# Patient Record
Sex: MALE | Race: WHITE | NOT HISPANIC OR LATINO | Employment: UNEMPLOYED
[De-identification: names, ages, dates, MRNs, and addresses within clinical notes are randomized per-mention and may not be internally consistent; named-entity substitution may affect disease eponyms.]

---
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
## 2021-01-02 DIAGNOSIS — S82.852A CLOSED TRIMALLEOLAR FRACTURE OF LEFT ANKLE, INITIAL ENCOUNTER: ICD-10-CM

## 2021-01-02 PROCEDURE — U0003 INFECTIOUS AGENT DETECTION BY NUCLEIC ACID (DNA OR RNA); SEVERE ACUTE RESPIRATORY SYNDROME CORONAVIRUS 2 (SARS-COV-2) (CORONAVIRUS DISEASE [COVID-19]), AMPLIFIED PROBE TECHNIQUE, MAKING USE OF HIGH THROUGHPUT TECHNOLOGIES AS DESCRIBED BY CMS-2020-01-R: HCPCS | Performed by: PHYSICIAN ASSISTANT

## 2021-01-02 PROCEDURE — U0005 INFEC AGEN DETEC AMPLI PROBE: HCPCS | Performed by: PHYSICIAN ASSISTANT

## 2021-01-03 LAB
LABORATORY COMMENT REPORT: NORMAL
SARS-COV-2 RNA SPEC QL NAA+PROBE: NEGATIVE
SARS-COV-2 RNA SPEC QL NAA+PROBE: NORMAL
SPECIMEN SOURCE: NORMAL
SPECIMEN SOURCE: NORMAL

## 2021-01-06 ENCOUNTER — NURSE TRIAGE (OUTPATIENT)
Dept: NURSING | Facility: CLINIC | Age: 21
End: 2021-01-06

## 2021-01-07 NOTE — TELEPHONE ENCOUNTER
Caller had ORIF of ankle fracture yesterday; Noticing dried  red blood  on lateral dressing through  ACE banadage. size of a quarter ,when he opened the CAM  boot . Didn't see it before but can't be sure   no other problems   advised to pooja perimeters of blood stain with pen  Advised to adjust  air in CAM so less pressure on  wound   advised to call back if it seems to be increasing  Or if he has any  other new or worsening symptoms   advised to call ortho clinic in morning with update   Caller understands and will comply   Nicky Smith RN  FNA      Reason for Disposition    [1] Caller has NON-URGENT question AND [2] triager unable to answer question    Additional Information    Negative: [1] Major abdominal surgical incision AND [2] wound gaping open AND [3] visible internal organs    Negative: Sounds like a life-threatening emergency to the triager    Negative: [1] Bleeding from incision AND [2] won't stop after 10 minutes of direct pressure    Negative: [1] Widespread rash AND [2] bright red, sunburn-like    Negative: Severe pain in the incision    Negative: [1] Incision gaping open AND [2] < 48 hours since wound re-opened    Negative: [1] Incision gaping open AND [2] length of opening > 2 inches (5 cm)    Negative: Patient sounds very sick or weak to the triager    Negative: Sounds like a serious complication to the triager    Negative: Fever > 100.4 F (38.0 C)    Negative: [1] Incision looks infected (spreading redness, pain) AND [2] fever > 99.5 F (37.5 C)    Negative: [1] Incision looks infected (spreading redness, pain) AND [2] large red area (> 2 in. or 5 cm)    Negative: [1] Incision looks infected (spreading redness, pain) AND [2] face wound    Negative: [1] Red streak runs from the incision AND [2] longer than 1 inch (2.5 cm)    Negative: [1] Pus or bad-smelling fluid draining from incision AND [2] no fever    Negative: [1] Post-op pain AND [2] not controlled with pain medications    Negative:  Dressing soaked with blood or body fluid (e.g., drainage)    Negative: [1] Raised bruise and [2] size > 2 inches (5 cm) and expanding    Negative: [1] Caller has URGENT question AND [2] triager unable to answer question    Negative: [1] INCREASING pain in incision AND [2] > 2 days (48 hours) since surgery    Negative: [1] Small red area or streak AND [2] no fever    Negative: [1] Clear or blood-tinged fluid draining from wound AND [2] no fever    Negative: [1] Incision gaping open AND [2] > 48 hours since wound re-opened AND [3] length of opening > 1/2 inch (12 mm)    Negative: [1] Incision on face gaping open after skin glue AND [2] > 48 hours since wound re-opened 3] length of opening > 1/4 inch (6 mm)    Negative: Suture or staple removal is overdue    Protocols used: POST-OP INCISION SYMPTOMS-A-AH

## 2021-01-20 ENCOUNTER — ANCILLARY PROCEDURE (OUTPATIENT)
Dept: GENERAL RADIOLOGY | Facility: CLINIC | Age: 21
End: 2021-01-20
Attending: ORTHOPAEDIC SURGERY
Payer: MEDICAID

## 2021-01-20 ENCOUNTER — OFFICE VISIT (OUTPATIENT)
Dept: ORTHOPEDICS | Facility: CLINIC | Age: 21
End: 2021-01-20
Payer: MEDICAID

## 2021-01-20 VITALS
DIASTOLIC BLOOD PRESSURE: 75 MMHG | SYSTOLIC BLOOD PRESSURE: 139 MMHG | HEART RATE: 86 BPM | BODY MASS INDEX: 32 KG/M2 | HEIGHT: 70 IN | OXYGEN SATURATION: 98 %

## 2021-01-20 DIAGNOSIS — S82.852A CLOSED TRIMALLEOLAR FRACTURE OF LEFT ANKLE, INITIAL ENCOUNTER: Primary | ICD-10-CM

## 2021-01-20 DIAGNOSIS — S82.852A CLOSED TRIMALLEOLAR FRACTURE OF LEFT ANKLE, INITIAL ENCOUNTER: ICD-10-CM

## 2021-01-20 PROCEDURE — 73610 X-RAY EXAM OF ANKLE: CPT | Mod: LT | Performed by: RADIOLOGY

## 2021-01-20 PROCEDURE — 99024 POSTOP FOLLOW-UP VISIT: CPT | Performed by: ORTHOPAEDIC SURGERY

## 2021-01-20 ASSESSMENT — PAIN SCALES - GENERAL: PAINLEVEL: NO PAIN (0)

## 2021-01-20 NOTE — LETTER
1/20/2021         RE: Zackery Chakraborty  [address]  [address]  [facility] [address]        Dear Colleague,    Thank you for referring your patient, Zackery Chakraborty, to the [facility]. Please see a copy of my visit note below.    CHIEF COMPLAINT:   Chief Complaint   Patient presents with     Left Ankle - Surgical Followup     Fracture - open reduction internal fixation. DOS 1/5/21, 2 wk s/p. Patient is present non weightbearing in the boot. Patient notes his ankle is doing good. He had one fall but landed on his buttocks. He has remained in boot non weightbearing.         SURGICAL PROCEDURE: ([facility])  1. Open reduction internal fixation  left ankle fracture, with fixation of the medial malleolus and fibula shaft fracture .  2. Left ankle syndesmotic fixation with tightrope device  3. Closed treatment posterior malleolus fracture.  DATE OF PROCEDURE: 1/5/2021      HISTORY OF PRESENT ILLNESS    Zackery Chakraborty is a 20 year old male seen for postoperative follow-up of a left ankle fracture open-reduction, internal fixation  that occurred 2 weeks ago. Since surgery, pain has been improving. Denies fevers, chills, night sweats. No wound problems. Compliant with weight bearing restrictions and elevation in boot. There have been no issues since surgery. Denies numbness or tingling. Took pain medications only twice since surgery and that was the first day only. Pain  0/10.    He reports a fall since surgery but doesn't think he injured the ankle, he fell back onto his buttocks.    Other PMH:  has no past medical history on file.  Patient Active Problem List   Diagnosis     Intermittent asthma     ADHD (attention deficit hyperactivity disorder)     Tourette syndrome     Seasonal allergies     Constipation     Obesity       Past surgical History:  has no past surgical history on file.    Medications:   Current Outpatient Medications:      albuterol (PROAIR HFA, PROVENTIL HFA, VENTOLIN HFA)  "108 (90 BASE) MCG/ACT inhaler, Inhale 2 puffs into the lungs every 6 hours as needed for shortness of breath / dyspnea, Disp: 1 Inhaler, Rfl: 1     fluticasone (FLONASE) 50 MCG/ACT nasal spray, Spray 2 sprays into both nostrils daily, Disp: 16 g, Rfl: 11     loratadine (CLARITIN) 10 MG tablet, Take 1 tablet (10 mg) by mouth daily, Disp: 90 tablet, Rfl: 1     olopatadine (PATANOL) 0.1 % ophthalmic solution, Place 1 drop into both eyes 2 times daily, Disp: 5 mL, Rfl: 3    Allergies:   Allergies   Allergen Reactions     Augmentin Diarrhea     Sulfa Drugs Rash       REVIEW OF SYSTEMS:  CONSTITUTIONAL:NEGATIVE for fever, chills, change in weight  INTEGUMENTARY/SKIN: NEGATIVE for worrisome rashes, moles or lesions  MUSCULOSKELETAL:See HPI above  NEURO: NEGATIVE for weakness, dizziness or paresthesias      PHYSICAL EXAM:  /75   Pulse 86   Ht 1.778 m (5' 10\")   SpO2 98%   BMI 32.00 kg/m     GENERAL APPEARANCE: healthy, alert, no distress   SKIN: no suspicious lesions or rashes  NEURO: Normal strength and tone, mentation intact and speech normal  PSYCH:  mentation appears normal and affect normal  RESPIRATORY: No increased work of breathing.          left LOWER EXTREMITY EXAM:  Gait: using crutches for support  Intact sensation deep peroneal nerve, superficial peroneal nerve, med/lat tibial nerve, sural nerve, saphenous nerve but slight decreased isolated to the big toe  Intact EHL, EDL, TA, FHL, GS, quadriceps hamstrings and hip flexors  Toes warm and well perfused, brisk capillary refill. Palpable 2+ dp pulses.  calf soft and nttp or squeeze.  Edema: none    Wound / Incision: medial and lateral based incisions healing well, sutures in place, skin edges everted.  Inspection: swelling, ecchymosis  Palpation: tender to palpation medial and lateral ankle, incisions.  Non-tender: calf.  Strength: grossly intact ankle dorsiflexion and plantarflexion.      X-RAY:  3 views left ankle from 1/20/2021 were reviewed in " clinic today. Reduction of medial malleolus with 2 screws. The fibula shaft fracture reduced with plate/screws. Posterior malleolus fracture reduced.          Impression: 20 year old male  2 weeks  postoperative open-reduction, internal fixation  left ankle fracture, doing well.    Plan:   * images reviewed  * suture removal.  * monitor big toe numbness, could be from boot wear/pressure.  Weight bearing status: non weight bearing, strict  Pain control: over the counter as needed.  Immobilization: fracture boot.  Elevation of affected extremity  Images: 3 views left ankle  Return to clinic in 4 weeks, or sooner as needed.      Emerson Duke M.D., M.S.  Dept. of Orthopaedic Surgery  [facility]      Again, thank you for allowing me to participate in the care of your patient.        Sincerely,        Emerson Duke MD

## 2021-01-20 NOTE — PROGRESS NOTES
CHIEF COMPLAINT:   Chief Complaint   Patient presents with     Left Ankle - Surgical Followup     Fracture - open reduction internal fixation. DOS 1/5/21, 2 wk s/p. Patient is present non weightbearing in the boot. Patient notes his ankle is doing good. He had one fall but landed on his buttocks. He has remained in boot non weightbearing.         SURGICAL PROCEDURE: ([facility])  1. Open reduction internal fixation  left ankle fracture, with fixation of the medial malleolus and fibula shaft fracture .  2. Left ankle syndesmotic fixation with tightrope device  3. Closed treatment posterior malleolus fracture.  DATE OF PROCEDURE: 1/5/2021      HISTORY OF PRESENT ILLNESS    Zackery Chakraborty is a 20 year old male seen for postoperative follow-up of a left ankle fracture open-reduction, internal fixation  that occurred 2 weeks ago. Since surgery, pain has been improving. Denies fevers, chills, night sweats. No wound problems. Compliant with weight bearing restrictions and elevation in boot. There have been no issues since surgery. Denies numbness or tingling. Took pain medications only twice since surgery and that was the first day only. Pain  0/10.    He reports a fall since surgery but doesn't think he injured the ankle, he fell back onto his buttocks.    Other PMH:  has no past medical history on file.  Patient Active Problem List   Diagnosis     Intermittent asthma     ADHD (attention deficit hyperactivity disorder)     Tourette syndrome     Seasonal allergies     Constipation     Obesity       Past surgical History:  has no past surgical history on file.    Medications:   Current Outpatient Medications:      albuterol (PROAIR HFA, PROVENTIL HFA, VENTOLIN HFA) 108 (90 BASE) MCG/ACT inhaler, Inhale 2 puffs into the lungs every 6 hours as needed for shortness of breath / dyspnea, Disp: 1 Inhaler, Rfl: 1     fluticasone (FLONASE) 50 MCG/ACT nasal spray, Spray 2 sprays into both nostrils daily, Disp: 16 g, Rfl:  "11     loratadine (CLARITIN) 10 MG tablet, Take 1 tablet (10 mg) by mouth daily, Disp: 90 tablet, Rfl: 1     olopatadine (PATANOL) 0.1 % ophthalmic solution, Place 1 drop into both eyes 2 times daily, Disp: 5 mL, Rfl: 3    Allergies:   Allergies   Allergen Reactions     Augmentin Diarrhea     Sulfa Drugs Rash       REVIEW OF SYSTEMS:  CONSTITUTIONAL:NEGATIVE for fever, chills, change in weight  INTEGUMENTARY/SKIN: NEGATIVE for worrisome rashes, moles or lesions  MUSCULOSKELETAL:See HPI above  NEURO: NEGATIVE for weakness, dizziness or paresthesias      PHYSICAL EXAM:  /75   Pulse 86   Ht 1.778 m (5' 10\")   SpO2 98%   BMI 32.00 kg/m     GENERAL APPEARANCE: healthy, alert, no distress   SKIN: no suspicious lesions or rashes  NEURO: Normal strength and tone, mentation intact and speech normal  PSYCH:  mentation appears normal and affect normal  RESPIRATORY: No increased work of breathing.          left LOWER EXTREMITY EXAM:  Gait: using crutches for support  Intact sensation deep peroneal nerve, superficial peroneal nerve, med/lat tibial nerve, sural nerve, saphenous nerve but slight decreased isolated to the big toe  Intact EHL, EDL, TA, FHL, GS, quadriceps hamstrings and hip flexors  Toes warm and well perfused, brisk capillary refill. Palpable 2+ dp pulses.  calf soft and nttp or squeeze.  Edema: none    Wound / Incision: medial and lateral based incisions healing well, sutures in place, skin edges everted.  Inspection: swelling, ecchymosis  Palpation: tender to palpation medial and lateral ankle, incisions.  Non-tender: calf.  Strength: grossly intact ankle dorsiflexion and plantarflexion.      X-RAY:  3 views left ankle from 1/20/2021 were reviewed in clinic today. Reduction of medial malleolus with 2 screws. The fibula shaft fracture reduced with plate/screws. Posterior malleolus fracture reduced.          Impression: 20 year old male  2 weeks  postoperative open-reduction, internal fixation  left ankle " fracture, doing well.    Plan:   * images reviewed  * suture removal.  * monitor big toe numbness, could be from boot wear/pressure.  Weight bearing status: non weight bearing, strict  Pain control: over the counter as needed.  Immobilization: fracture boot.  Elevation of affected extremity  Images: 3 views left ankle  Return to clinic in 4 weeks, or sooner as needed.      Emerson Duke M.D., M.S.  Dept. of Orthopaedic Surgery  [facility]

## 2021-02-18 ENCOUNTER — OFFICE VISIT (OUTPATIENT)
Dept: ORTHOPEDICS | Facility: CLINIC | Age: 21
End: 2021-02-18
Payer: COMMERCIAL

## 2021-02-18 ENCOUNTER — ANCILLARY PROCEDURE (OUTPATIENT)
Dept: GENERAL RADIOLOGY | Facility: CLINIC | Age: 21
End: 2021-02-18
Attending: ORTHOPAEDIC SURGERY
Payer: COMMERCIAL

## 2021-02-18 VITALS
HEART RATE: 94 BPM | BODY MASS INDEX: 32 KG/M2 | OXYGEN SATURATION: 95 % | DIASTOLIC BLOOD PRESSURE: 64 MMHG | SYSTOLIC BLOOD PRESSURE: 113 MMHG | HEIGHT: 70 IN

## 2021-02-18 DIAGNOSIS — S82.852A CLOSED TRIMALLEOLAR FRACTURE OF LEFT ANKLE, INITIAL ENCOUNTER: Primary | ICD-10-CM

## 2021-02-18 DIAGNOSIS — S82.852A CLOSED TRIMALLEOLAR FRACTURE OF LEFT ANKLE, INITIAL ENCOUNTER: ICD-10-CM

## 2021-02-18 PROCEDURE — 73610 X-RAY EXAM OF ANKLE: CPT | Mod: LT | Performed by: RADIOLOGY

## 2021-02-18 PROCEDURE — 99024 POSTOP FOLLOW-UP VISIT: CPT | Performed by: ORTHOPAEDIC SURGERY

## 2021-02-18 ASSESSMENT — PAIN SCALES - GENERAL: PAINLEVEL: NO PAIN (0)

## 2021-02-18 NOTE — LETTER
2/18/2021         RE: Zackery Chakraborty  [address]  [address]  [place] [address]        Dear Colleague,    Thank you for referring your patient, Zackery Chakraborty, to the [facility]. Please see a copy of my visit note below.    CHIEF COMPLAINT:   Chief Complaint   Patient presents with     Left Ankle - Surgical Followup     Fracture - open reduction internal fixation. DOS 1/5/21, 6 wk s/p. Patient notes his ankle is doing good. Patient is present using crutches and in boot. He denies any pain.          SURGICAL PROCEDURE: ([facility])  1. Open reduction internal fixation  left ankle fracture, with fixation of the medial malleolus and fibula shaft fracture .  2. Left ankle syndesmotic fixation with tightrope device  3. Closed treatment posterior malleolus fracture.  DATE OF PROCEDURE: 1/5/2021      HISTORY OF PRESENT ILLNESS    Zackery Chakraborty is a 20 year old male seen for postoperative follow-up of a left ankle fracture open-reduction, internal fixation  that occurred 6 weeks ago. Returns today doing well. No pain. Denies fevers, chills, night sweats. No wound problems. Compliant with weight bearing restrictions and elevation in boot. There have been no issues since surgery. Denies numbness or tingling. Took pain medications only twice since surgery and that was the first day only. Pain  0/10. Has been caught sleep walking a few times this week in his boot, no noted pain the next morning.      Other PMH:  has no past medical history on file.  Patient Active Problem List   Diagnosis     Intermittent asthma     ADHD (attention deficit hyperactivity disorder)     Tourette syndrome     Seasonal allergies     Constipation     Obesity       Past surgical History:  has no past surgical history on file.    Medications:   Current Outpatient Medications:      albuterol (PROAIR HFA, PROVENTIL HFA, VENTOLIN HFA) 108 (90 BASE) MCG/ACT inhaler, Inhale 2 puffs into the lungs every 6  "hours as needed for shortness of breath / dyspnea, Disp: 1 Inhaler, Rfl: 1     fluticasone (FLONASE) 50 MCG/ACT nasal spray, Spray 2 sprays into both nostrils daily, Disp: 16 g, Rfl: 11     loratadine (CLARITIN) 10 MG tablet, Take 1 tablet (10 mg) by mouth daily, Disp: 90 tablet, Rfl: 1     olopatadine (PATANOL) 0.1 % ophthalmic solution, Place 1 drop into both eyes 2 times daily, Disp: 5 mL, Rfl: 3    Allergies:   Allergies   Allergen Reactions     Augmentin Diarrhea     Sulfa Drugs Rash       REVIEW OF SYSTEMS:  CONSTITUTIONAL:NEGATIVE for fever, chills, change in weight  INTEGUMENTARY/SKIN: NEGATIVE for worrisome rashes, moles or lesions  MUSCULOSKELETAL:See HPI above  NEURO: NEGATIVE for weakness, dizziness or paresthesias      PHYSICAL EXAM:  /64   Pulse 94   Ht 1.778 m (5' 10\")   SpO2 95%   BMI 32.00 kg/m     GENERAL APPEARANCE: healthy, alert, no distress   SKIN: no suspicious lesions or rashes  NEURO: Normal strength and tone, mentation intact and speech normal  PSYCH:  mentation appears normal and affect normal  RESPIRATORY: No increased work of breathing.          left LOWER EXTREMITY EXAM:  Gait: using crutches for support  Intact sensation deep peroneal nerve, superficial peroneal nerve, med/lat tibial nerve, sural nerve, saphenous nerve but slight decreased isolated to the big toe  Intact EHL, EDL, TA, FHL, GS, quadriceps hamstrings and hip flexors  Toes warm and well perfused, brisk capillary refill. Palpable 2+ dp pulses.  calf soft and nttp or squeeze.  Edema: none    Wound / Incision: medial and lateral based incisions healing well  Inspection: mild swelling, no ecchymosis, no erythema  Palpation: minimal tender to palpation medial and lateral ankle, incisions.  Non-tender: calf.  Strength: grossly intact ankle dorsiflexion and plantarflexion.  Range of motion: decreased.    X-RAY:  3 views left ankle from 2/18/2021 were reviewed in clinic today. Reduction of medial malleolus with 2 " screws. The fibula shaft fracture reduced with plate/screws. Posterior malleolus fracture reduced. Fracture lucency decreased. Symmetric mortise.          Impression: 20 year old male 6 weeks  postoperative open-reduction, internal fixation  left ankle fracture, doing well.    Plan:   * images reviewed, fractures healing well.  Weight bearing status: progress weight bearing in boot, start 50% this week, 75% the following week then 100%. Must be in boot.  Pain control: over the counter as needed.  Immobilization: fracture boot. Off at rest, hygiene, home exercise program and Physical Therapy.  Elevation of affected extremity  Physical Therapy referral, aggressive range of motion.  Images: 3 views left ankle  Return to clinic in 4 weeks, or sooner as needed. Plan transition to ankle brace at that time.      Emerson Duke M.D., M.S.  Dept. of Orthopaedic Surgery  [facility]      Again, thank you for allowing me to participate in the care of your patient.        Sincerely,        Emerson Duke MD

## 2021-02-18 NOTE — PROGRESS NOTES
CHIEF COMPLAINT:   Chief Complaint   Patient presents with     Left Ankle - Surgical Followup     Fracture - open reduction internal fixation. DOS 1/5/21, 6 wk s/p. Patient notes his ankle is doing good. Patient is present using crutches and in boot. He denies any pain.          SURGICAL PROCEDURE: ([facility])  1. Open reduction internal fixation  left ankle fracture, with fixation of the medial malleolus and fibula shaft fracture .  2. Left ankle syndesmotic fixation with tightrope device  3. Closed treatment posterior malleolus fracture.  DATE OF PROCEDURE: 1/5/2021      HISTORY OF PRESENT ILLNESS    Zackery Chakraborty is a 20 year old male seen for postoperative follow-up of a left ankle fracture open-reduction, internal fixation  that occurred 6 weeks ago. Returns today doing well. No pain. Denies fevers, chills, night sweats. No wound problems. Compliant with weight bearing restrictions and elevation in boot. There have been no issues since surgery. Denies numbness or tingling. Took pain medications only twice since surgery and that was the first day only. Pain  0/10. Has been caught sleep walking a few times this week in his boot, no noted pain the next morning.      Other PMH:  has no past medical history on file.  Patient Active Problem List   Diagnosis     Intermittent asthma     ADHD (attention deficit hyperactivity disorder)     Tourette syndrome     Seasonal allergies     Constipation     Obesity       Past surgical History:  has no past surgical history on file.    Medications:   Current Outpatient Medications:      albuterol (PROAIR HFA, PROVENTIL HFA, VENTOLIN HFA) 108 (90 BASE) MCG/ACT inhaler, Inhale 2 puffs into the lungs every 6 hours as needed for shortness of breath / dyspnea, Disp: 1 Inhaler, Rfl: 1     fluticasone (FLONASE) 50 MCG/ACT nasal spray, Spray 2 sprays into both nostrils daily, Disp: 16 g, Rfl: 11     loratadine (CLARITIN) 10 MG tablet, Take 1 tablet (10 mg) by mouth daily,  "Disp: 90 tablet, Rfl: 1     olopatadine (PATANOL) 0.1 % ophthalmic solution, Place 1 drop into both eyes 2 times daily, Disp: 5 mL, Rfl: 3    Allergies:   Allergies   Allergen Reactions     Augmentin Diarrhea     Sulfa Drugs Rash       REVIEW OF SYSTEMS:  CONSTITUTIONAL:NEGATIVE for fever, chills, change in weight  INTEGUMENTARY/SKIN: NEGATIVE for worrisome rashes, moles or lesions  MUSCULOSKELETAL:See HPI above  NEURO: NEGATIVE for weakness, dizziness or paresthesias      PHYSICAL EXAM:  /64   Pulse 94   Ht 1.778 m (5' 10\")   SpO2 95%   BMI 32.00 kg/m     GENERAL APPEARANCE: healthy, alert, no distress   SKIN: no suspicious lesions or rashes  NEURO: Normal strength and tone, mentation intact and speech normal  PSYCH:  mentation appears normal and affect normal  RESPIRATORY: No increased work of breathing.          left LOWER EXTREMITY EXAM:  Gait: using crutches for support  Intact sensation deep peroneal nerve, superficial peroneal nerve, med/lat tibial nerve, sural nerve, saphenous nerve but slight decreased isolated to the big toe  Intact EHL, EDL, TA, FHL, GS, quadriceps hamstrings and hip flexors  Toes warm and well perfused, brisk capillary refill. Palpable 2+ dp pulses.  calf soft and nttp or squeeze.  Edema: none    Wound / Incision: medial and lateral based incisions healing well  Inspection: mild swelling, no ecchymosis, no erythema  Palpation: minimal tender to palpation medial and lateral ankle, incisions.  Non-tender: calf.  Strength: grossly intact ankle dorsiflexion and plantarflexion.  Range of motion: decreased.    X-RAY:  3 views left ankle from 2/18/2021 were reviewed in clinic today. Reduction of medial malleolus with 2 screws. The fibula shaft fracture reduced with plate/screws. Posterior malleolus fracture reduced. Fracture lucency decreased. Symmetric mortise.          Impression: 20 year old male 6 weeks  postoperative open-reduction, internal fixation  left ankle fracture, doing " well.    Plan:   * images reviewed, fractures healing well.  Weight bearing status: progress weight bearing in boot, start 50% this week, 75% the following week then 100%. Must be in boot.  Pain control: over the counter as needed.  Immobilization: fracture boot. Off at rest, hygiene, home exercise program and Physical Therapy.  Elevation of affected extremity  Physical Therapy referral, aggressive range of motion.  Images: 3 views left ankle  Return to clinic in 4 weeks, or sooner as needed. Plan transition to ankle brace at that time.      Emerson Duke M.D., M.S.  Dept. of Orthopaedic Surgery  [facility]

## 2021-03-01 ENCOUNTER — THERAPY VISIT (OUTPATIENT)
Dept: PHYSICAL THERAPY | Facility: CLINIC | Age: 21
End: 2021-03-01
Attending: PHYSICIAN ASSISTANT
Payer: COMMERCIAL

## 2021-03-01 DIAGNOSIS — S82.852A CLOSED TRIMALLEOLAR FRACTURE OF LEFT ANKLE, INITIAL ENCOUNTER: ICD-10-CM

## 2021-03-01 DIAGNOSIS — M25.572 LEFT ANKLE PAIN, UNSPECIFIED CHRONICITY: ICD-10-CM

## 2021-03-01 DIAGNOSIS — Z47.89 AFTERCARE FOLLOWING SURGERY OF THE MUSCULOSKELETAL SYSTEM, NEC: ICD-10-CM

## 2021-03-01 PROCEDURE — 97110 THERAPEUTIC EXERCISES: CPT | Mod: GP | Performed by: PHYSICAL THERAPIST

## 2021-03-01 PROCEDURE — 97161 PT EVAL LOW COMPLEX 20 MIN: CPT | Mod: GP | Performed by: PHYSICAL THERAPIST

## 2021-03-15 ENCOUNTER — THERAPY VISIT (OUTPATIENT)
Dept: PHYSICAL THERAPY | Facility: CLINIC | Age: 21
End: 2021-03-15
Payer: COMMERCIAL

## 2021-03-15 DIAGNOSIS — M25.572 LEFT ANKLE PAIN, UNSPECIFIED CHRONICITY: ICD-10-CM

## 2021-03-15 DIAGNOSIS — Z47.89 AFTERCARE FOLLOWING SURGERY OF THE MUSCULOSKELETAL SYSTEM, NEC: ICD-10-CM

## 2021-03-15 PROCEDURE — 97110 THERAPEUTIC EXERCISES: CPT | Mod: GP | Performed by: PHYSICAL THERAPIST

## 2021-03-25 ENCOUNTER — THERAPY VISIT (OUTPATIENT)
Dept: PHYSICAL THERAPY | Facility: CLINIC | Age: 21
End: 2021-03-25
Payer: COMMERCIAL

## 2021-03-25 ENCOUNTER — ANCILLARY PROCEDURE (OUTPATIENT)
Dept: GENERAL RADIOLOGY | Facility: CLINIC | Age: 21
End: 2021-03-25
Attending: ORTHOPAEDIC SURGERY
Payer: COMMERCIAL

## 2021-03-25 ENCOUNTER — OFFICE VISIT (OUTPATIENT)
Dept: ORTHOPEDICS | Facility: CLINIC | Age: 21
End: 2021-03-25
Payer: COMMERCIAL

## 2021-03-25 VITALS
OXYGEN SATURATION: 96 % | HEIGHT: 70 IN | HEART RATE: 93 BPM | BODY MASS INDEX: 32 KG/M2 | SYSTOLIC BLOOD PRESSURE: 123 MMHG | DIASTOLIC BLOOD PRESSURE: 66 MMHG

## 2021-03-25 DIAGNOSIS — S82.852A CLOSED TRIMALLEOLAR FRACTURE OF LEFT ANKLE, INITIAL ENCOUNTER: ICD-10-CM

## 2021-03-25 DIAGNOSIS — S82.852D CLOSED TRIMALLEOLAR FRACTURE OF LEFT ANKLE WITH ROUTINE HEALING, SUBSEQUENT ENCOUNTER: Primary | ICD-10-CM

## 2021-03-25 DIAGNOSIS — Z47.89 AFTERCARE FOLLOWING SURGERY OF THE MUSCULOSKELETAL SYSTEM, NEC: ICD-10-CM

## 2021-03-25 DIAGNOSIS — M25.572 LEFT ANKLE PAIN, UNSPECIFIED CHRONICITY: ICD-10-CM

## 2021-03-25 PROCEDURE — 97110 THERAPEUTIC EXERCISES: CPT | Mod: GP | Performed by: PHYSICAL THERAPIST

## 2021-03-25 PROCEDURE — 99024 POSTOP FOLLOW-UP VISIT: CPT | Performed by: ORTHOPAEDIC SURGERY

## 2021-03-25 PROCEDURE — 97140 MANUAL THERAPY 1/> REGIONS: CPT | Mod: GP | Performed by: PHYSICAL THERAPIST

## 2021-03-25 PROCEDURE — 73610 X-RAY EXAM OF ANKLE: CPT | Mod: LT | Performed by: RADIOLOGY

## 2021-03-25 ASSESSMENT — PAIN SCALES - GENERAL: PAINLEVEL: NO PAIN (0)

## 2021-03-25 NOTE — PROGRESS NOTES
Subjective:  HPI  Physical Exam                    Objective:  System    Physical Exam    General     ROS    Assessment/Plan:    PROGRESS  REPORT    Progress reporting period is from 3/01/21 to 3/25/21.       SUBJECTIVE  Patient reports significant improvement in his left ankle since the start of therapy.  He is currently 3 months S/P.  Motion feels a lot better.   He has been able to ambulate up to an hour in the boot without any problem.   If he tries to go longer than this, heel starts to get sore.    Current pain level is 1/10 (occasionally in heel) .     Previous pain level was  1/10  .   Changes in function:  Yes (See Goal flowsheet attached for changes in current functional level)  Adverse reaction to treatment or activity: None    OBJECTIVE  AROM L Ankle: DF 0, PF 55, INV 22, EV 10  Flexibility:  Decreased in gastroc and especially soleus  Strength L Ankle:  All motions 5/5 and painfree.  Scar mobility is good  Swelling:  None  Gait: ambulates FWB'ing in boot        ASSESSMENT/PLAN  Updated problem list and treatment plan: Diagnosis 1:  Left Ankle Pain   Pain -  self management, education and home program  Decreased ROM/flexibility - manual therapy and therapeutic exercise  Impaired gait - gait training  Decreased function - home program  STG/LTGs have been met or progress has been made towards goals:  Yes (See Goal flow sheet completed today.)  Assessment of Progress: The patient's condition is improving.  Patient is meeting short term goals and is progressing towards long term goals.  Self Management Plans:  Patient has been instructed in a home texercise program.  I have re-evaluated this patient and find that the nature, scope, duration and intensity of the therapy is appropriate for the medical condition of the patient.  Zackery continues to require the following intervention to meet STG and LTG's:  PT    Recommendations:  This patient would benefit from continued therapy to further progress with ROM,  strengthening, and proprioception once out of the boot.       Frequency:  1 X week, once daily  Duration:  for 3-4 weeks        Please refer to the daily flowsheet for treatment today, total treatment time and time spent performing 1:1 timed codes.

## 2021-03-25 NOTE — NURSING NOTE
Patient was fitted with a large f8 ankle brace. All questions were answered to patient's satisfaction. DME form explained, signed, and copy given to the patient for their records.   Dolly Roe Clinical Medical Assistant

## 2021-03-25 NOTE — LETTER
3/25/2021         RE: Zackery Chakraborty  [address]  [address]  [facility] [address]        Dear Colleague,    Thank you for referring your patient, Zackery Chakraborty, to the [facility]. Please see a copy of my visit note below.    CHIEF COMPLAINT:   Chief Complaint   Patient presents with     Left Ankle - Surgical Followup     fracture - open reduction internal fixation with syndesmotic fixation. DOS 1/5/21, 11 wk s/p.          SURGICAL PROCEDURE: ([facility])  1. Open reduction internal fixation  left ankle fracture, with fixation of the medial malleolus and fibula shaft fracture .  2. Left ankle syndesmotic fixation with tightrope device  3. Closed treatment posterior malleolus fracture.  DATE OF PROCEDURE: 1/5/2021      HISTORY OF PRESENT ILLNESS    Zackery Chakraborty is a 20 year old male seen for postoperative follow-up of a left ankle fracture open-reduction, internal fixation  that occurred 11 weeks ago. Returns today doing well. No pain. Denies fevers, chills, night sweats. No wound problems. Weight bearing as tolerated in boot. Going to Physical Therapy.      Other PMH:  has no past medical history on file.  Patient Active Problem List   Diagnosis     Intermittent asthma     ADHD (attention deficit hyperactivity disorder)     Tourette syndrome     Seasonal allergies     Constipation     Obesity     Ankle pain, left     Aftercare following surgery of the musculoskeletal system, NEC       Past surgical History:  has no past surgical history on file.    Medications:   Current Outpatient Medications:      albuterol (PROAIR HFA, PROVENTIL HFA, VENTOLIN HFA) 108 (90 BASE) MCG/ACT inhaler, Inhale 2 puffs into the lungs every 6 hours as needed for shortness of breath / dyspnea, Disp: 1 Inhaler, Rfl: 1     fluticasone (FLONASE) 50 MCG/ACT nasal spray, Spray 2 sprays into both nostrils daily, Disp: 16 g, Rfl: 11     loratadine (CLARITIN) 10 MG tablet, Take 1 tablet (10 mg) by  "mouth daily, Disp: 90 tablet, Rfl: 1     olopatadine (PATANOL) 0.1 % ophthalmic solution, Place 1 drop into both eyes 2 times daily, Disp: 5 mL, Rfl: 3    Allergies:   Allergies   Allergen Reactions     Augmentin Diarrhea     Sulfa Drugs Rash       REVIEW OF SYSTEMS:  CONSTITUTIONAL:NEGATIVE for fever, chills, change in weight  INTEGUMENTARY/SKIN: NEGATIVE for worrisome rashes, moles or lesions  MUSCULOSKELETAL:See HPI above  NEURO: NEGATIVE for weakness, dizziness or paresthesias      PHYSICAL EXAM:  /66   Pulse 93   Ht 1.778 m (5' 10\")   SpO2 96%   BMI 32.00 kg/m     GENERAL APPEARANCE: healthy, alert, no distress   SKIN: no suspicious lesions or rashes  NEURO: Normal strength and tone, mentation intact and speech normal  PSYCH:  mentation appears normal and affect normal  RESPIRATORY: No increased work of breathing.          left LOWER EXTREMITY EXAM:  Gait: favors left.  Intact sensation deep peroneal nerve, superficial peroneal nerve, med/lat tibial nerve, sural nerve, saphenous nerve but slight decreased isolated to the big toe  Intact EHL, EDL, TA, FHL, GS, quadriceps hamstrings and hip flexors  Toes warm and well perfused, brisk capillary refill. Palpable 2+ dp pulses.  calf soft and nttp or squeeze.  Edema: none    Wound / Incision: medial and lateral based incisions healing well  Inspection: minimal swelling, no ecchymosis, no erythema  Palpation: nontender to palpation medial and lateral ankle, incisions.  Non-tender: calf.  Strength: grossly intact ankle dorsiflexion and plantarflexion.      X-RAY:  3 views left ankle from 3/25/2021 were reviewed in clinic today. Reduction of medial malleolus with 2 screws. The fibula shaft fracture reduced with plate/screws. Posterior malleolus fracture reduced. Fracture lucency decreased, not well visualized. Symmetric mortise.          Impression: 20 year old male 11 weeks  postoperative open-reduction, internal fixation  left ankle fracture, doing " well.    Plan:   * images reviewed, fractures healing well. Essentially healed.  Weight bearing status: weight bearing as tolerated in ankle brace x4 weeks then ok to discontinue per comfort.  Pain control: over the counter as needed.  Immobilization: ankle brace with weight bearing activities, otherwise ok to be off.  Elevation of affected extremity  Physical Therapy referral, aggressive range of motion, strengthening.  Images: 3 views left ankle  Return to clinic in 4 weeks only if needed.      Emerson Duke M.D., M.S.  Dept. of Orthopaedic Surgery  [facility]      Again, thank you for allowing me to participate in the care of your patient.        Sincerely,        Emerson Duke MD

## 2021-03-25 NOTE — PROGRESS NOTES
CHIEF COMPLAINT:   Chief Complaint   Patient presents with     Left Ankle - Surgical Followup     fracture - open reduction internal fixation with syndesmotic fixation. DOS 1/5/21, 11 wk s/p.          SURGICAL PROCEDURE: ([facility])  1. Open reduction internal fixation  left ankle fracture, with fixation of the medial malleolus and fibula shaft fracture .  2. Left ankle syndesmotic fixation with tightrope device  3. Closed treatment posterior malleolus fracture.  DATE OF PROCEDURE: 1/5/2021      HISTORY OF PRESENT ILLNESS    Zackery Chakraborty is a 20 year old male seen for postoperative follow-up of a left ankle fracture open-reduction, internal fixation  that occurred 11 weeks ago. Returns today doing well. No pain. Denies fevers, chills, night sweats. No wound problems. Weight bearing as tolerated in boot. Going to Physical Therapy.      Other PMH:  has no past medical history on file.  Patient Active Problem List   Diagnosis     Intermittent asthma     ADHD (attention deficit hyperactivity disorder)     Tourette syndrome     Seasonal allergies     Constipation     Obesity     Ankle pain, left     Aftercare following surgery of the musculoskeletal system, NEC       Past surgical History:  has no past surgical history on file.    Medications:   Current Outpatient Medications:      albuterol (PROAIR HFA, PROVENTIL HFA, VENTOLIN HFA) 108 (90 BASE) MCG/ACT inhaler, Inhale 2 puffs into the lungs every 6 hours as needed for shortness of breath / dyspnea, Disp: 1 Inhaler, Rfl: 1     fluticasone (FLONASE) 50 MCG/ACT nasal spray, Spray 2 sprays into both nostrils daily, Disp: 16 g, Rfl: 11     loratadine (CLARITIN) 10 MG tablet, Take 1 tablet (10 mg) by mouth daily, Disp: 90 tablet, Rfl: 1     olopatadine (PATANOL) 0.1 % ophthalmic solution, Place 1 drop into both eyes 2 times daily, Disp: 5 mL, Rfl: 3    Allergies:   Allergies   Allergen Reactions     Augmentin Diarrhea     Sulfa Drugs Rash       REVIEW OF  "SYSTEMS:  CONSTITUTIONAL:NEGATIVE for fever, chills, change in weight  INTEGUMENTARY/SKIN: NEGATIVE for worrisome rashes, moles or lesions  MUSCULOSKELETAL:See HPI above  NEURO: NEGATIVE for weakness, dizziness or paresthesias      PHYSICAL EXAM:  /66   Pulse 93   Ht 1.778 m (5' 10\")   SpO2 96%   BMI 32.00 kg/m     GENERAL APPEARANCE: healthy, alert, no distress   SKIN: no suspicious lesions or rashes  NEURO: Normal strength and tone, mentation intact and speech normal  PSYCH:  mentation appears normal and affect normal  RESPIRATORY: No increased work of breathing.          left LOWER EXTREMITY EXAM:  Gait: favors left.  Intact sensation deep peroneal nerve, superficial peroneal nerve, med/lat tibial nerve, sural nerve, saphenous nerve but slight decreased isolated to the big toe  Intact EHL, EDL, TA, FHL, GS, quadriceps hamstrings and hip flexors  Toes warm and well perfused, brisk capillary refill. Palpable 2+ dp pulses.  calf soft and nttp or squeeze.  Edema: none    Wound / Incision: medial and lateral based incisions healing well  Inspection: minimal swelling, no ecchymosis, no erythema  Palpation: nontender to palpation medial and lateral ankle, incisions.  Non-tender: calf.  Strength: grossly intact ankle dorsiflexion and plantarflexion.      X-RAY:  3 views left ankle from 3/25/2021 were reviewed in clinic today. Reduction of medial malleolus with 2 screws. The fibula shaft fracture reduced with plate/screws. Posterior malleolus fracture reduced. Fracture lucency decreased, not well visualized. Symmetric mortise.          Impression: 20 year old male 11 weeks  postoperative open-reduction, internal fixation  left ankle fracture, doing well.    Plan:   * images reviewed, fractures healing well. Essentially healed.  Weight bearing status: weight bearing as tolerated in ankle brace x4 weeks then ok to discontinue per comfort.  Pain control: over the counter as needed.  Immobilization: ankle brace with " weight bearing activities, otherwise ok to be off.  Elevation of affected extremity  Physical Therapy referral, aggressive range of motion, strengthening.  Images: 3 views left ankle  Return to clinic in 4 weeks only if needed.      Emerson Duke M.D., M.S.  Dept. of Orthopaedic Surgery  [facility]

## 2021-06-25 PROBLEM — Z47.89 AFTERCARE FOLLOWING SURGERY OF THE MUSCULOSKELETAL SYSTEM, NEC: Status: RESOLVED | Noted: 2021-03-01 | Resolved: 2021-06-25

## 2021-06-25 PROBLEM — M25.572 ANKLE PAIN, LEFT: Status: RESOLVED | Noted: 2021-03-01 | Resolved: 2021-06-25

## 2021-06-25 NOTE — PROGRESS NOTES
Patient did not return for further treatment so no additional progress was noted.  Please refer to the progress note and goal flowsheet completed on 03/25/21 for discharge information.

## 2021-10-29 ENCOUNTER — APPOINTMENT (OUTPATIENT)
Dept: GENERAL RADIOLOGY | Facility: CLINIC | Age: 21
End: 2021-10-29
Attending: EMERGENCY MEDICINE
Payer: COMMERCIAL

## 2021-10-29 ENCOUNTER — APPOINTMENT (OUTPATIENT)
Dept: CT IMAGING | Facility: CLINIC | Age: 21
End: 2021-10-29
Attending: EMERGENCY MEDICINE
Payer: COMMERCIAL

## 2021-10-29 ENCOUNTER — HOSPITAL ENCOUNTER (EMERGENCY)
Facility: CLINIC | Age: 21
Discharge: HOME | End: 2021-10-29
Attending: EMERGENCY MEDICINE | Admitting: EMERGENCY MEDICINE
Payer: COMMERCIAL

## 2021-10-29 VITALS
HEART RATE: 69 BPM | OXYGEN SATURATION: 99 % | WEIGHT: 243 LBS | RESPIRATION RATE: 16 BRPM | HEIGHT: 71 IN | SYSTOLIC BLOOD PRESSURE: 132 MMHG | DIASTOLIC BLOOD PRESSURE: 57 MMHG | TEMPERATURE: 98.1 F | BODY MASS INDEX: 34.02 KG/M2

## 2021-10-29 DIAGNOSIS — M54.2 NECK PAIN: ICD-10-CM

## 2021-10-29 DIAGNOSIS — M54.6 ACUTE MIDLINE THORACIC BACK PAIN: ICD-10-CM
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
## 2021-10-29 DIAGNOSIS — S20.212A CONTUSION OF LEFT CHEST WALL, INITIAL ENCOUNTER: ICD-10-CM

## 2021-10-29 LAB
ALBUMIN SERPL-MCNC: 4.3 G/DL (ref 3.4–5)
ALP SERPL-CCNC: 70 U/L (ref 40–150)
ALT SERPL W P-5'-P-CCNC: 43 U/L (ref 0–70)
ANION GAP SERPL CALCULATED.3IONS-SCNC: 3 MMOL/L (ref 3–14)
AST SERPL W P-5'-P-CCNC: 11 U/L (ref 0–45)
BASOPHILS # BLD AUTO: 0 10E3/UL (ref 0–0.2)
BASOPHILS NFR BLD AUTO: 0 %
BILIRUB SERPL-MCNC: 0.3 MG/DL (ref 0.2–1.3)
BUN SERPL-MCNC: 16 MG/DL (ref 7–30)
CALCIUM SERPL-MCNC: 9.3 MG/DL (ref 8.5–10.1)
CHLORIDE BLD-SCNC: 104 MMOL/L (ref 94–109)
CO2 SERPL-SCNC: 32 MMOL/L (ref 20–32)
CREAT SERPL-MCNC: 0.79 MG/DL (ref 0.66–1.25)
EOSINOPHIL # BLD AUTO: 0.2 10E3/UL (ref 0–0.7)
EOSINOPHIL NFR BLD AUTO: 2 %
ERYTHROCYTE [DISTWIDTH] IN BLOOD BY AUTOMATED COUNT: 12 % (ref 10–15)
GFR SERPL CREATININE-BSD FRML MDRD: >90 ML/MIN/1.73M2
GLUCOSE BLD-MCNC: 106 MG/DL (ref 70–99)
HCT VFR BLD AUTO: 46.1 % (ref 40–53)
HGB BLD-MCNC: 15.4 G/DL (ref 13.3–17.7)
HOLD SPECIMEN: NORMAL
IMM GRANULOCYTES # BLD: 0 10E3/UL
IMM GRANULOCYTES NFR BLD: 0 %
LIPASE SERPL-CCNC: 80 U/L (ref 73–393)
LYMPHOCYTES # BLD AUTO: 2.9 10E3/UL (ref 0.8–5.3)
LYMPHOCYTES NFR BLD AUTO: 31 %
MCH RBC QN AUTO: 29.2 PG (ref 26.5–33)
MCHC RBC AUTO-ENTMCNC: 33.4 G/DL (ref 31.5–36.5)
MCV RBC AUTO: 88 FL (ref 78–100)
MONOCYTES # BLD AUTO: 0.5 10E3/UL (ref 0–1.3)
MONOCYTES NFR BLD AUTO: 5 %
NEUTROPHILS # BLD AUTO: 5.7 10E3/UL (ref 1.6–8.3)
NEUTROPHILS NFR BLD AUTO: 62 %
NRBC # BLD AUTO: 0 10E3/UL
NRBC BLD AUTO-RTO: 0 /100
PLATELET # BLD AUTO: 271 10E3/UL (ref 150–450)
POTASSIUM BLD-SCNC: 3.6 MMOL/L (ref 3.4–5.3)
PROT SERPL-MCNC: 8.3 G/DL (ref 6.8–8.8)
RBC # BLD AUTO: 5.27 10E6/UL (ref 4.4–5.9)
SODIUM SERPL-SCNC: 139 MMOL/L (ref 133–144)
WBC # BLD AUTO: 9.3 10E3/UL (ref 4–11)

## 2021-10-29 PROCEDURE — 76604 US EXAM CHEST: CPT | Performed by: EMERGENCY MEDICINE

## 2021-10-29 PROCEDURE — 71250 CT THORAX DX C-: CPT

## 2021-10-29 PROCEDURE — 83690 ASSAY OF LIPASE: CPT | Performed by: EMERGENCY MEDICINE

## 2021-10-29 PROCEDURE — 99285 EMERGENCY DEPT VISIT HI MDM: CPT | Mod: 25 | Performed by: EMERGENCY MEDICINE

## 2021-10-29 PROCEDURE — 36415 COLL VENOUS BLD VENIPUNCTURE: CPT | Performed by: EMERGENCY MEDICINE

## 2021-10-29 PROCEDURE — 93308 TTE F-UP OR LMTD: CPT | Performed by: EMERGENCY MEDICINE

## 2021-10-29 PROCEDURE — 250N000013 HC RX MED GY IP 250 OP 250 PS 637: Performed by: EMERGENCY MEDICINE

## 2021-10-29 PROCEDURE — 76705 ECHO EXAM OF ABDOMEN: CPT | Performed by: EMERGENCY MEDICINE

## 2021-10-29 PROCEDURE — 76604 US EXAM CHEST: CPT | Mod: 26 | Performed by: EMERGENCY MEDICINE

## 2021-10-29 PROCEDURE — 93308 TTE F-UP OR LMTD: CPT | Mod: 26 | Performed by: EMERGENCY MEDICINE

## 2021-10-29 PROCEDURE — 82040 ASSAY OF SERUM ALBUMIN: CPT | Performed by: EMERGENCY MEDICINE

## 2021-10-29 PROCEDURE — 85025 COMPLETE CBC W/AUTO DIFF WBC: CPT | Performed by: EMERGENCY MEDICINE

## 2021-10-29 PROCEDURE — 72125 CT NECK SPINE W/O DYE: CPT

## 2021-10-29 PROCEDURE — 73110 X-RAY EXAM OF WRIST: CPT | Mod: LT

## 2021-10-29 PROCEDURE — 76705 ECHO EXAM OF ABDOMEN: CPT | Mod: 26 | Performed by: EMERGENCY MEDICINE

## 2021-10-29 RX ORDER — ACETAMINOPHEN 500 MG
1000 TABLET ORAL ONCE
Status: COMPLETED | OUTPATIENT
Start: 2021-10-29 | End: 2021-10-29

## 2021-10-29 RX ORDER — OXYCODONE HYDROCHLORIDE 5 MG/1
5 TABLET ORAL EVERY 6 HOURS PRN
Qty: 12 TABLET | Refills: 0 | Status: SHIPPED | OUTPATIENT
Start: 2021-10-29

## 2021-10-29 RX ORDER — OXYCODONE HYDROCHLORIDE 5 MG/1
5 TABLET ORAL EVERY 6 HOURS PRN
Qty: 12 TABLET | Refills: 0 | Status: SHIPPED | OUTPATIENT
Start: 2021-10-29 | End: 2021-10-29

## 2021-10-29 RX ADMIN — ACETAMINOPHEN 1000 MG: 500 TABLET, FILM COATED ORAL at 22:01

## 2021-10-29 RX ADMIN — IBUPROFEN 600 MG: 200 TABLET, FILM COATED ORAL at 22:01

## 2021-10-29 ASSESSMENT — MIFFLIN-ST. JEOR: SCORE: 2134.37
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
## 2021-10-30 NOTE — DISCHARGE INSTRUCTIONS
Your evaluation emergency department was reassuring.  I expect you have more pain and soreness tomorrow.  This should start to improve over the course the next few days.  If it does not you should be reevaluated by your primary provider.    I recommend 1000 mg of acetaminophen and 600 mg of ibuprofen 3 times per day for primary pain control.  You may take oxycodone for breakthrough pain.  Do not operate machinery or drive a vehicle while taking narcotic pain medication.    If your symptoms worsen or you develop new or concerning symptoms, please return to the Emergency Department for further evaluation and treatment.
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
## 2024-01-06 ENCOUNTER — OFFICE VISIT (OUTPATIENT)
Dept: URGENT CARE | Facility: URGENT CARE | Age: 24
End: 2024-01-06
Payer: COMMERCIAL

## 2024-01-06 VITALS
RESPIRATION RATE: 18 BRPM | OXYGEN SATURATION: 98 % | WEIGHT: 215 LBS | DIASTOLIC BLOOD PRESSURE: 78 MMHG | BODY MASS INDEX: 29.99 KG/M2 | SYSTOLIC BLOOD PRESSURE: 120 MMHG | TEMPERATURE: 99 F | HEART RATE: 82 BPM

## 2024-01-06 DIAGNOSIS — J02.8 ACUTE PHARYNGITIS DUE TO OTHER SPECIFIED ORGANISMS: Primary | ICD-10-CM

## 2024-01-06 DIAGNOSIS — R07.0 THROAT PAIN: ICD-10-CM

## 2024-01-06 LAB
DEPRECATED S PYO AG THROAT QL EIA: NEGATIVE
FLUAV AG SPEC QL IA: NEGATIVE
FLUBV AG SPEC QL IA: NEGATIVE

## 2024-01-06 PROCEDURE — 87651 STREP A DNA AMP PROBE: CPT | Performed by: FAMILY MEDICINE

## 2024-01-06 PROCEDURE — 87804 INFLUENZA ASSAY W/OPTIC: CPT | Performed by: FAMILY MEDICINE

## 2024-01-06 PROCEDURE — 87635 SARS-COV-2 COVID-19 AMP PRB: CPT | Performed by: FAMILY MEDICINE

## 2024-01-06 PROCEDURE — 99203 OFFICE O/P NEW LOW 30 MIN: CPT | Performed by: FAMILY MEDICINE

## 2024-01-06 NOTE — LETTER
January 6, 2024      Zackery Chakraborty  [address]  [facility] [address]        To Whom It May Concern:      Zackery Chakraborty  was seen on 01/06/2024.  Please excuse his work absence for next 2 days.     Let us know if there are any questions.      Sincerely,        Sanchez Roper MD

## 2024-01-07 LAB — GROUP A STREP BY PCR: NOT DETECTED

## 2024-01-07 NOTE — PROGRESS NOTES
Assessment & Plan     Acute pharyngitis due to other specified organisms  Differentials discussed in detail including viral pharyngitis.  Rapid strep and influenza negative.  COVID-19 test ordered.  Recommended well hydration, warm fluids, over-the-counter analgesia, salt water gargles and to follow-up if symptoms persist or worsen.  Patient understood and in agreement with above plan.  All questions answered.  - Influenza A/B antigen  - Symptomatic COVID-19 Virus (Coronavirus) by PCR Nose    Throat pain  - Streptococcus A Rapid Screen w/Reflex to PCR  - Group A Streptococcus PCR Throat Swab      Sanchez Roper MD  [facility]   Zackery is a 23 year old, presenting for the following health issues:  Urgent Care (Present for sore throat that started this morning. )      HPI     Concern -   Onset: this morning   Description: sore throat   Intensity: moderate  Progression of Symptoms:  same  Accompanying Signs & Symptoms: no fever, chills, cough, sob or other relevant systemic symptoms   Previous history of similar problem:   Therapies tried and outcome: None      Review of Systems   Constitutional, HEENT, cardiovascular, pulmonary, gi and gu systems are negative, except as otherwise noted.      Objective    /78   Pulse 82   Temp 99  F (37.2  C) (Tympanic)   Resp 18   Wt 97.5 kg (215 lb)   SpO2 98%   BMI 29.99 kg/m    Body mass index is 29.99 kg/m .  Physical Exam   GENERAL: alert and no distress  EYES: Eyes grossly normal to inspection, PERRL and conjunctivae and sclerae normal  HENT: normal cephalic/atraumatic, ear canals and TM's normal, oral mucous membranes moist, oropharxnx crowded, tonsillar hypertrophy, tonsillar erythema, and tonsillar exudate  NECK: no adenopathy, no asymmetry, masses, or scars and thyroid normal to palpation  RESP: lungs clear to auscultation - no rales, rhonchi or wheezes  CV: regular rates and rhythm, normal S1 S2, no S3 or S4, and no  murmur, click or rub  MS: no gross musculoskeletal defects noted, no edema  NEURO: Normal strength and tone, mentation intact and speech normal  PSYCH: mentation appears normal, affect normal/bright      Results for orders placed or performed in visit on 01/06/24   Streptococcus A Rapid Screen w/Reflex to PCR     Status: Normal    Specimen: Throat; Swab   Result Value Ref Range    Group A Strep antigen Negative Negative

## 2024-01-08 LAB — SARS-COV-2 RNA RESP QL NAA+PROBE: NEGATIVE
